# Patient Record
Sex: MALE | Race: AMERICAN INDIAN OR ALASKA NATIVE | NOT HISPANIC OR LATINO | Employment: UNEMPLOYED | URBAN - NONMETROPOLITAN AREA
[De-identification: names, ages, dates, MRNs, and addresses within clinical notes are randomized per-mention and may not be internally consistent; named-entity substitution may affect disease eponyms.]

---

## 2017-11-08 ENCOUNTER — NON-PROVIDER VISIT (OUTPATIENT)
Dept: URGENT CARE | Facility: PHYSICIAN GROUP | Age: 42
End: 2017-11-08

## 2017-11-08 DIAGNOSIS — Z02.1 PRE-EMPLOYMENT DRUG SCREENING: ICD-10-CM

## 2017-11-08 DIAGNOSIS — Z02.1 DRUG SCREENING, PRE-EMPLOYMENT: ICD-10-CM

## 2017-11-08 LAB
AMP AMPHETAMINE: NORMAL
BAR BARBITURATES: NORMAL
BZO BENZODIAZEPINES: NORMAL
COC COCAINE: NORMAL
INT CON NEG: NORMAL
INT CON POS: NORMAL
MDMA ECSTASY: NORMAL
MET METHAMPHETAMINES: NORMAL
MTD METHADONE: NORMAL
OPI OPIATES: NORMAL
OXY OXYCODONE: NORMAL
PCP PHENCYCLIDINE: NORMAL
POC URINE DRUG SCREEN OCDRS: NORMAL
THC: NORMAL

## 2017-11-08 PROCEDURE — 80305 DRUG TEST PRSMV DIR OPT OBS: CPT | Performed by: PHYSICIAN ASSISTANT

## 2017-12-21 ENCOUNTER — NON-PROVIDER VISIT (OUTPATIENT)
Dept: URGENT CARE | Facility: PHYSICIAN GROUP | Age: 42
End: 2017-12-21

## 2017-12-21 DIAGNOSIS — Z02.1 PRE-EMPLOYMENT DRUG SCREENING: ICD-10-CM

## 2017-12-21 LAB
AMP AMPHETAMINE: NORMAL
COC COCAINE: NORMAL
INT CON NEG: NORMAL
INT CON POS: NORMAL
MET METHAMPHETAMINES: NORMAL
OPI OPIATES: NORMAL
PCP PHENCYCLIDINE: NORMAL
POC DRUG COMMENT 753798-OCCUPATIONAL HEALTH: NORMAL
THC: NORMAL

## 2017-12-21 PROCEDURE — 80305 DRUG TEST PRSMV DIR OPT OBS: CPT | Performed by: PHYSICIAN ASSISTANT

## 2019-01-17 ENCOUNTER — NON-PROVIDER VISIT (OUTPATIENT)
Dept: URGENT CARE | Facility: PHYSICIAN GROUP | Age: 44
End: 2019-01-17

## 2019-01-17 DIAGNOSIS — Z02.1 PRE-EMPLOYMENT DRUG SCREENING: ICD-10-CM

## 2019-01-17 LAB
AMP AMPHETAMINE: NORMAL
COC COCAINE: NORMAL
INT CON NEG: NORMAL
INT CON POS: NORMAL
MET METHAMPHETAMINES: NORMAL
OPI OPIATES: NORMAL
PCP PHENCYCLIDINE: NORMAL
POC DRUG COMMENT 753798-OCCUPATIONAL HEALTH: NEGATIVE
THC: NORMAL

## 2019-01-17 PROCEDURE — 80305 DRUG TEST PRSMV DIR OPT OBS: CPT | Performed by: PHYSICIAN ASSISTANT

## 2019-04-11 ENCOUNTER — NON-PROVIDER VISIT (OUTPATIENT)
Dept: URGENT CARE | Facility: PHYSICIAN GROUP | Age: 44
End: 2019-04-11

## 2019-04-11 DIAGNOSIS — Z02.1 PRE-EMPLOYMENT DRUG SCREENING: ICD-10-CM

## 2019-04-11 PROCEDURE — 80305 DRUG TEST PRSMV DIR OPT OBS: CPT | Performed by: FAMILY MEDICINE

## 2021-08-22 ENCOUNTER — APPOINTMENT (OUTPATIENT)
Dept: RADIOLOGY | Facility: MEDICAL CENTER | Age: 46
End: 2021-08-22
Attending: EMERGENCY MEDICINE
Payer: MEDICAID

## 2021-08-22 ENCOUNTER — HOSPITAL ENCOUNTER (EMERGENCY)
Facility: MEDICAL CENTER | Age: 46
End: 2021-08-26
Attending: EMERGENCY MEDICINE
Payer: MEDICAID

## 2021-08-22 DIAGNOSIS — D50.0 BLOOD LOSS ANEMIA: ICD-10-CM

## 2021-08-22 DIAGNOSIS — F33.41 RECURRENT MAJOR DEPRESSIVE DISORDER, IN PARTIAL REMISSION (HCC): ICD-10-CM

## 2021-08-22 DIAGNOSIS — F10.929 ALCOHOLIC INTOXICATION WITH COMPLICATION (HCC): ICD-10-CM

## 2021-08-22 DIAGNOSIS — S41.112A ARM LACERATION, LEFT, INITIAL ENCOUNTER: ICD-10-CM

## 2021-08-22 DIAGNOSIS — R73.9 HYPERGLYCEMIA: ICD-10-CM

## 2021-08-22 DIAGNOSIS — U07.1 COVID-19: ICD-10-CM

## 2021-08-22 LAB
ABO + RH BLD: NORMAL
ABO GROUP BLD: NORMAL
ALBUMIN SERPL BCP-MCNC: 1.6 G/DL (ref 3.2–4.9)
ALBUMIN/GLOB SERPL: 0.9 G/DL
ALP SERPL-CCNC: 38 U/L (ref 30–99)
ALT SERPL-CCNC: 47 U/L (ref 2–50)
AMPHET UR QL SCN: POSITIVE
ANION GAP SERPL CALC-SCNC: 20 MMOL/L (ref 7–16)
APTT PPP: 41.8 SEC (ref 24.7–36)
AST SERPL-CCNC: 72 U/L (ref 12–45)
BARBITURATES UR QL SCN: NEGATIVE
BARCODED ABORH UBTYP: 5100
BARCODED PRD CODE UBPRD: NORMAL
BARCODED UNIT NUM UBUNT: NORMAL
BENZODIAZ UR QL SCN: POSITIVE
BILIRUB SERPL-MCNC: 0.7 MG/DL (ref 0.1–1.5)
BLD GP AB SCN SERPL QL: NORMAL
BUN SERPL-MCNC: 28 MG/DL (ref 8–22)
BZE UR QL SCN: NEGATIVE
CALCIUM SERPL-MCNC: 7 MG/DL (ref 8.5–10.5)
CANNABINOIDS UR QL SCN: POSITIVE
CHLORIDE SERPL-SCNC: 96 MMOL/L (ref 96–112)
CO2 SERPL-SCNC: 12 MMOL/L (ref 20–33)
COMPONENT R 8504R: NORMAL
CREAT SERPL-MCNC: 0.92 MG/DL (ref 0.5–1.4)
ERYTHROCYTE [DISTWIDTH] IN BLOOD BY AUTOMATED COUNT: 41.2 FL (ref 35.9–50)
ETHANOL BLD-MCNC: 33.2 MG/DL (ref 0–10)
FLUAV RNA SPEC QL NAA+PROBE: NEGATIVE
FLUBV RNA SPEC QL NAA+PROBE: NEGATIVE
GLOBULIN SER CALC-MCNC: 1.8 G/DL (ref 1.9–3.5)
GLUCOSE SERPL-MCNC: 234 MG/DL (ref 65–99)
HCT VFR BLD AUTO: 22.4 % (ref 42–52)
HGB BLD-MCNC: 7.2 G/DL (ref 14–18)
INR PPP: 1.85 (ref 0.87–1.13)
MCH RBC QN AUTO: 29.3 PG (ref 27–33)
MCHC RBC AUTO-ENTMCNC: 32.1 G/DL (ref 33.7–35.3)
MCV RBC AUTO: 91.1 FL (ref 81.4–97.8)
METHADONE UR QL SCN: NEGATIVE
OPIATES UR QL SCN: NEGATIVE
OXYCODONE UR QL SCN: NEGATIVE
PCP UR QL SCN: NEGATIVE
PLATELET # BLD AUTO: 124 K/UL (ref 164–446)
PMV BLD AUTO: 10.2 FL (ref 9–12.9)
POC BREATHALIZER: 0 PERCENT (ref 0–0.01)
POTASSIUM SERPL-SCNC: 4.3 MMOL/L (ref 3.6–5.5)
PRODUCT TYPE UPROD: NORMAL
PROPOXYPH UR QL SCN: NEGATIVE
PROT SERPL-MCNC: 3.4 G/DL (ref 6–8.2)
PROTHROMBIN TIME: 20.8 SEC (ref 12–14.6)
RBC # BLD AUTO: 2.46 M/UL (ref 4.7–6.1)
RH BLD: NORMAL
RSV RNA SPEC QL NAA+PROBE: NEGATIVE
SARS-COV-2 RNA RESP QL NAA+PROBE: DETECTED
SODIUM SERPL-SCNC: 128 MMOL/L (ref 135–145)
SPECIMEN SOURCE: ABNORMAL
UNIT STATUS USTAT: NORMAL
WBC # BLD AUTO: 5.6 K/UL (ref 4.8–10.8)

## 2021-08-22 PROCEDURE — 700102 HCHG RX REV CODE 250 W/ 637 OVERRIDE(OP): Performed by: EMERGENCY MEDICINE

## 2021-08-22 PROCEDURE — 85730 THROMBOPLASTIN TIME PARTIAL: CPT

## 2021-08-22 PROCEDURE — 700111 HCHG RX REV CODE 636 W/ 250 OVERRIDE (IP): Performed by: EMERGENCY MEDICINE

## 2021-08-22 PROCEDURE — 72128 CT CHEST SPINE W/O DYE: CPT

## 2021-08-22 PROCEDURE — 71260 CT THORAX DX C+: CPT

## 2021-08-22 PROCEDURE — 99285 EMERGENCY DEPT VISIT HI MDM: CPT

## 2021-08-22 PROCEDURE — 71045 X-RAY EXAM CHEST 1 VIEW: CPT

## 2021-08-22 PROCEDURE — 86923 COMPATIBILITY TEST ELECTRIC: CPT

## 2021-08-22 PROCEDURE — 96365 THER/PROPH/DIAG IV INF INIT: CPT | Mod: XU

## 2021-08-22 PROCEDURE — 0241U HCHG SARS-COV-2 COVID-19 NFCT DS RESP RNA 4 TRGT MIC: CPT

## 2021-08-22 PROCEDURE — 304999 HCHG REPAIR-SIMPLE/INTERMED LEVEL 1

## 2021-08-22 PROCEDURE — 700111 HCHG RX REV CODE 636 W/ 250 OVERRIDE (IP)

## 2021-08-22 PROCEDURE — 72125 CT NECK SPINE W/O DYE: CPT

## 2021-08-22 PROCEDURE — 85610 PROTHROMBIN TIME: CPT

## 2021-08-22 PROCEDURE — 82077 ASSAY SPEC XCP UR&BREATH IA: CPT

## 2021-08-22 PROCEDURE — 302970 POC BREATHALIZER: Performed by: EMERGENCY MEDICINE

## 2021-08-22 PROCEDURE — 36430 TRANSFUSION BLD/BLD COMPNT: CPT

## 2021-08-22 PROCEDURE — 86850 RBC ANTIBODY SCREEN: CPT

## 2021-08-22 PROCEDURE — 96375 TX/PRO/DX INJ NEW DRUG ADDON: CPT | Mod: XU

## 2021-08-22 PROCEDURE — C9803 HOPD COVID-19 SPEC COLLECT: HCPCS | Performed by: EMERGENCY MEDICINE

## 2021-08-22 PROCEDURE — 72131 CT LUMBAR SPINE W/O DYE: CPT

## 2021-08-22 PROCEDURE — 80053 COMPREHEN METABOLIC PANEL: CPT

## 2021-08-22 PROCEDURE — A9270 NON-COVERED ITEM OR SERVICE: HCPCS | Performed by: EMERGENCY MEDICINE

## 2021-08-22 PROCEDURE — 303747 HCHG EXTRA SUTURE

## 2021-08-22 PROCEDURE — 70450 CT HEAD/BRAIN W/O DYE: CPT

## 2021-08-22 PROCEDURE — 85027 COMPLETE CBC AUTOMATED: CPT

## 2021-08-22 PROCEDURE — 72170 X-RAY EXAM OF PELVIS: CPT

## 2021-08-22 PROCEDURE — 90715 TDAP VACCINE 7 YRS/> IM: CPT | Performed by: EMERGENCY MEDICINE

## 2021-08-22 PROCEDURE — 700101 HCHG RX REV CODE 250: Performed by: EMERGENCY MEDICINE

## 2021-08-22 PROCEDURE — 86901 BLOOD TYPING SEROLOGIC RH(D): CPT

## 2021-08-22 PROCEDURE — 90791 PSYCH DIAGNOSTIC EVALUATION: CPT

## 2021-08-22 PROCEDURE — 90471 IMMUNIZATION ADMIN: CPT

## 2021-08-22 PROCEDURE — 700117 HCHG RX CONTRAST REV CODE 255: Performed by: EMERGENCY MEDICINE

## 2021-08-22 PROCEDURE — P9016 RBC LEUKOCYTES REDUCED: HCPCS

## 2021-08-22 PROCEDURE — 305949 HCHG RED TRAUMA ACT PRE-NOTIFY NO CC

## 2021-08-22 PROCEDURE — 86900 BLOOD TYPING SEROLOGIC ABO: CPT

## 2021-08-22 PROCEDURE — 96376 TX/PRO/DX INJ SAME DRUG ADON: CPT | Mod: XU

## 2021-08-22 PROCEDURE — 80307 DRUG TEST PRSMV CHEM ANLYZR: CPT

## 2021-08-22 PROCEDURE — 700105 HCHG RX REV CODE 258: Performed by: EMERGENCY MEDICINE

## 2021-08-22 RX ORDER — HALOPERIDOL 5 MG/1
5 TABLET ORAL ONCE
Status: COMPLETED | OUTPATIENT
Start: 2021-08-22 | End: 2021-08-22

## 2021-08-22 RX ORDER — MIDAZOLAM HYDROCHLORIDE 1 MG/ML
INJECTION INTRAMUSCULAR; INTRAVENOUS
Status: COMPLETED
Start: 2021-08-22 | End: 2021-08-22

## 2021-08-22 RX ORDER — KETAMINE HYDROCHLORIDE 50 MG/ML
INJECTION, SOLUTION INTRAMUSCULAR; INTRAVENOUS
Status: COMPLETED | OUTPATIENT
Start: 2021-08-22 | End: 2021-08-22

## 2021-08-22 RX ORDER — MIDAZOLAM HYDROCHLORIDE 1 MG/ML
2 INJECTION INTRAMUSCULAR; INTRAVENOUS ONCE
Status: COMPLETED | OUTPATIENT
Start: 2021-08-22 | End: 2021-08-22

## 2021-08-22 RX ORDER — CEFAZOLIN SODIUM 2 G/100ML
INJECTION, SOLUTION INTRAVENOUS
Status: COMPLETED | OUTPATIENT
Start: 2021-08-22 | End: 2021-08-22

## 2021-08-22 RX ORDER — SODIUM CHLORIDE, SODIUM LACTATE, POTASSIUM CHLORIDE, AND CALCIUM CHLORIDE .6; .31; .03; .02 G/100ML; G/100ML; G/100ML; G/100ML
INJECTION, SOLUTION INTRAVENOUS
Status: COMPLETED | OUTPATIENT
Start: 2021-08-22 | End: 2021-08-22

## 2021-08-22 RX ORDER — DIPHENOXYLATE HYDROCHLORIDE AND ATROPINE SULFATE 2.5; .025 MG/1; MG/1
1 TABLET ORAL ONCE
Status: COMPLETED | OUTPATIENT
Start: 2021-08-22 | End: 2021-08-22

## 2021-08-22 RX ORDER — MIDAZOLAM HYDROCHLORIDE 1 MG/ML
INJECTION INTRAMUSCULAR; INTRAVENOUS
Status: COMPLETED | OUTPATIENT
Start: 2021-08-22 | End: 2021-08-22

## 2021-08-22 RX ORDER — ONDANSETRON 2 MG/ML
4 INJECTION INTRAMUSCULAR; INTRAVENOUS ONCE
Status: COMPLETED | OUTPATIENT
Start: 2021-08-22 | End: 2021-08-22

## 2021-08-22 RX ORDER — LIDOCAINE HYDROCHLORIDE AND EPINEPHRINE 10; 10 MG/ML; UG/ML
20 INJECTION, SOLUTION INFILTRATION; PERINEURAL ONCE
Status: COMPLETED | OUTPATIENT
Start: 2021-08-22 | End: 2021-08-22

## 2021-08-22 RX ORDER — LORAZEPAM 2 MG/1
2 TABLET ORAL ONCE
Status: COMPLETED | OUTPATIENT
Start: 2021-08-22 | End: 2021-08-22

## 2021-08-22 RX ADMIN — LORAZEPAM 2 MG: 2 TABLET ORAL at 21:30

## 2021-08-22 RX ADMIN — SODIUM CHLORIDE, POTASSIUM CHLORIDE, SODIUM LACTATE AND CALCIUM CHLORIDE 1 L: 600; 310; 30; 20 INJECTION, SOLUTION INTRAVENOUS at 12:00

## 2021-08-22 RX ADMIN — FENTANYL CITRATE 100 MCG: 50 INJECTION, SOLUTION INTRAMUSCULAR; INTRAVENOUS at 12:02

## 2021-08-22 RX ADMIN — CEFAZOLIN SODIUM 2 G: 2 INJECTION, SOLUTION INTRAVENOUS at 12:15

## 2021-08-22 RX ADMIN — ONDANSETRON 4 MG: 2 INJECTION INTRAMUSCULAR; INTRAVENOUS at 17:55

## 2021-08-22 RX ADMIN — MIDAZOLAM HYDROCHLORIDE 2 MG: 1 INJECTION INTRAMUSCULAR; INTRAVENOUS at 12:25

## 2021-08-22 RX ADMIN — ONDANSETRON 4 MG: 2 INJECTION INTRAMUSCULAR; INTRAVENOUS at 18:37

## 2021-08-22 RX ADMIN — MIDAZOLAM HYDROCHLORIDE 2 MG: 1 INJECTION, SOLUTION INTRAMUSCULAR; INTRAVENOUS at 12:25

## 2021-08-22 RX ADMIN — IOHEXOL 100 ML: 350 INJECTION, SOLUTION INTRAVENOUS at 12:38

## 2021-08-22 RX ADMIN — HALOPERIDOL 5 MG: 5 TABLET ORAL at 21:30

## 2021-08-22 RX ADMIN — CLOSTRIDIUM TETANI TOXOID ANTIGEN (FORMALDEHYDE INACTIVATED), CORYNEBACTERIUM DIPHTHERIAE TOXOID ANTIGEN (FORMALDEHYDE INACTIVATED), BORDETELLA PERTUSSIS TOXOID ANTIGEN (GLUTARALDEHYDE INACTIVATED), BORDETELLA PERTUSSIS FILAMENTOUS HEMAGGLUTININ ANTIGEN (FORMALDEHYDE INACTIVATED), BORDETELLA PERTUSSIS PERTACTIN ANTIGEN, AND BORDETELLA PERTUSSIS FIMBRIAE 2/3 ANTIGEN 0.5 ML: 5; 2; 2.5; 5; 3; 5 INJECTION, SUSPENSION INTRAMUSCULAR at 12:08

## 2021-08-22 RX ADMIN — KETAMINE HYDROCHLORIDE 50 MG: 50 INJECTION INTRAMUSCULAR; INTRAVENOUS at 12:02

## 2021-08-22 RX ADMIN — MIDAZOLAM HYDROCHLORIDE 3 MG: 1 INJECTION, SOLUTION INTRAMUSCULAR; INTRAVENOUS at 12:00

## 2021-08-22 RX ADMIN — LIDOCAINE HYDROCHLORIDE,EPINEPHRINE BITARTRATE 20 ML: 10; .01 INJECTION, SOLUTION INFILTRATION; PERINEURAL at 13:20

## 2021-08-22 RX ADMIN — DIPHENOXYLATE HYDROCHLORIDE AND ATROPINE SULFATE 1 TABLET: 2.5; .025 TABLET ORAL at 19:15

## 2021-08-22 NOTE — ED NOTES
Unable to obtain another IV access point.    MD Carballo to order Right groin art stick for labs.

## 2021-08-22 NOTE — ED NOTES
Pt awoken, pt reoriented to situation. Pt states that he cut his own arm in attempt to hurt himself. When asked if pt still wants to hurt himself, pt denies. Pt moaning but unable to express what is hurting. Pt falls asleep quickly. Attempted to obtain urine via urinal, no success. VSS on RA
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
## 2021-08-22 NOTE — ED NOTES
Pt incontinent of large amount of stool. Pericare performed, linens changed. Pt responds to pain and movement, occasionally yells out. Snoring, nasal trumpet in place

## 2021-08-23 PROCEDURE — A9270 NON-COVERED ITEM OR SERVICE: HCPCS | Performed by: EMERGENCY MEDICINE

## 2021-08-23 PROCEDURE — 90791 PSYCH DIAGNOSTIC EVALUATION: CPT | Performed by: SOCIAL WORKER

## 2021-08-23 PROCEDURE — 700102 HCHG RX REV CODE 250 W/ 637 OVERRIDE(OP): Performed by: EMERGENCY MEDICINE

## 2021-08-23 RX ORDER — LOPERAMIDE HYDROCHLORIDE 2 MG/1
2 CAPSULE ORAL 4 TIMES DAILY PRN
Status: DISCONTINUED | OUTPATIENT
Start: 2021-08-23 | End: 2021-08-26 | Stop reason: HOSPADM

## 2021-08-23 RX ORDER — ACETAMINOPHEN 325 MG/1
650 TABLET ORAL ONCE
Status: COMPLETED | OUTPATIENT
Start: 2021-08-23 | End: 2021-08-23

## 2021-08-23 RX ADMIN — LOPERAMIDE HYDROCHLORIDE 2 MG: 2 CAPSULE ORAL at 15:17

## 2021-08-23 RX ADMIN — ACETAMINOPHEN 650 MG: 325 TABLET, FILM COATED ORAL at 15:30

## 2021-08-23 ASSESSMENT — PAIN DESCRIPTION - PAIN TYPE: TYPE: ACUTE PAIN

## 2021-08-23 NOTE — ED NOTES
Pt asleep on gurney; respirations equal bilateral and not labored. Pt has no needs at this time. Sitter in place for pt safety. RN will continue to monitor.

## 2021-08-23 NOTE — ED NOTES
Patient's home medications have been reviewed by the pharmacy team.     No past medical history on file.    Patient's Medications    No medications on file          A:  Medications do not appear to be contributing to current complaints.       P:    No recommendations at this time.       Cristobal Laughlin, PharmD

## 2021-08-23 NOTE — ED NOTES
Legal 2000 hold. Pt resting in gurney, no acute distress noted. In full view of sitter at all times.
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
## 2021-08-23 NOTE — ED NOTES
Pt is not currently taking medications. Pt has not taking prescription medications  In > 5 years.      Med rec updated and complete.    No preferred pharmacy
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
## 2021-08-23 NOTE — DISCHARGE PLANNING
Medical Social Work    Referral: Legal Hold    Intervention: Legal Hold Paperwork given to SW by [employer] RN Jocy Watts    Legal Hold Initiated: Date: 08- Time: 2115    Patient’s Insurance Listed on Face Sheet: Medicaid FFS    Referrals sent to: [facility], [facility], [facility]    This referral contains the following information:  1) Face sheet _x___  2) Page 1 and Page 2 of Legal Hold ___x_  3) Alert Team Assessment/Psych Assessment __x__  4) Head to toe physical exam _x___  5) Urine Drug Screen __x__  6) Blood Alcohol __x__  7) Vital signs __x__  8) Pregnancy test when applicable _NA__  9) Medications list __x__    Plan: Patient will transfer to mental health facility once acceptance is obtained

## 2021-08-23 NOTE — ED NOTES
Legal 2000 hold. Pt given orange juice, able to void in bedside commode, no acute distress noted. In full view of sitter at all times.
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
## 2021-08-24 ASSESSMENT — ENCOUNTER SYMPTOMS
INSOMNIA: 0
DEPRESSION: 1
DIZZINESS: 0
SHORTNESS OF BREATH: 0
HEADACHES: 0
ORTHOPNEA: 0
BLURRED VISION: 0
PALPITATIONS: 0
CHILLS: 0
COUGH: 0
MYALGIAS: 0
NAUSEA: 0
NERVOUS/ANXIOUS: 1
FEVER: 0
VOMITING: 0

## 2021-08-24 NOTE — DISCHARGE PLANNING
Legal Hold     Referral: Legal Hold Court     Intervention: Pt presented for legal hold meeting with  via phone call to discuss legal options of contesting hold and meeting with the court doctors tomorrow afternoon, or not contesting legal hold and continuing the hold for one week.  advised that pt's legal hold will be be continued for one week (9/2).       Plan: Pt's legal hold has been continued for one week. Pt awaiting transfer to an in patient psych facility.

## 2021-08-24 NOTE — ED NOTES
Pt resting comfortably.  Bed repositioned for comfort and lights dimmed.  Sitter remains in place for continuous 1:1 observation.

## 2021-08-24 NOTE — ED PROVIDER NOTES
ED Provider Note    ED Provider Note Addendum     This is an addendum to the note on Manny Wilson.  For further details and full chart information, see patient's initial note.          2:15 PM   - Patient evaluated by myself.  Patient is resting comfortably at this time with no complaints. Patient is awaiting transfer to psychiatric facility for further psych evaluation.  Psychiatry is recommended to continue his hold    Disposition:  Patient will be transferred to an appropriate psychiatric facility in stable condition for further psychiatric care and evaluation.  Patient will be placed under the care of my partner awaiting transfer.    FINAL IMPRESSION  1. Self-inflicted injury    2. Arm laceration, left, initial encounter    3. Blood loss anemia    4. Alcoholic intoxication with complication (HCC)    5. Suicidal ideation            The note accurately reflects work and decisions made by me.  Yfn Soria M.D.  8/24/2021  2:15 PM

## 2021-08-24 NOTE — CONSULTS
"                  [facility] INITIAL PSYCHIATRIC EVALUATION    This provider informed the patient their medical records are totally confidential except for the use by other providers involved in their care, or if the patient signs a release, or to report instances of child or elder abuse, or if it is determined they are an immediate risk to harm themselves or others.      CONSULTED BY:   Yfn Soria M.d.     REASON FOR CONSULT:   Psychiatric evaluation     Chief Complaint   Patient presents with   • Trauma Red     This evaluation was conducted via Zoom using secure and encrypted videoconferencing technology. The patient was physically located at [facility] in [place], [place]. The patient was presented by a medical professional at the originating site.  The patient's identity was confirmed and verbal consent was obtained for this telemedicine encounter.    HISTORY OF PRESENT ILLNESS  Chart reviewed. Patient consents to telepsychiatry. 46M presented to the ED on 8/22/21, BIBEMS after being found \"face down in a field\" with an apparent self inflicted laceration to the left forearm.   Patient seen at bedside. He says \"I tried to kill myself.\" He says that he had not planned to kill himself but that he was being pursued and that the people that wanted to kill him, wanted it to look like suicide. Patient believes they \"are playing mind games\". He says that he was made to \"walk in the freezing dessert\". He says that its members \"of the [place] who want me dead\"   Patient reports long hx of SI but had been putting it \"off for years\" because I didn't want my mom to deal with the effects of my suicide. He does not want to discuss to much with this writer but admits that he would rather \"die than let them get me\". Despite that he reports being \"safe\" in the hospital and will not attempt to hurt himself or others.       MMSE  Cannot assess at this time       MEDICAL REVIEW OF SYSTEMS:   Review of Systems "   Constitutional: Negative for chills and fever.   HENT: Negative for hearing loss.    Eyes: Negative for blurred vision.   Respiratory: Negative for cough and shortness of breath.    Cardiovascular: Negative for palpitations and orthopnea.   Gastrointestinal: Negative for nausea and vomiting.   Genitourinary: Negative for dysuria.   Musculoskeletal: Negative for myalgias.   Neurological: Negative for dizziness and headaches.   Psychiatric/Behavioral: Positive for depression. Negative for suicidal ideas. The patient is nervous/anxious. The patient does not have insomnia.          PAST PSYCHIATRIC HISTORY  Denied     FAMILY HISTORY  Denied       SOCIAL HISTORY  Released from [facility] 10 days ago, denies having friends    DRUGS: denied     ALCOHOL: occasional     TOBACCO: denied     MEDICAL HISTORY       No past medical history on file.  No results found for this or any previous visit.    Lab Results   Component Value Date/Time    WBC 5.6 08/22/2021 11:59 AM    RBC 2.46 (L) 08/22/2021 11:59 AM    HEMOGLOBIN 7.2 (L) 08/22/2021 11:59 AM    HEMATOCRIT 22.4 (L) 08/22/2021 11:59 AM    MCV 91.1 08/22/2021 11:59 AM    MCH 29.3 08/22/2021 11:59 AM    MCHC 32.1 (L) 08/22/2021 11:59 AM    MPV 10.2 08/22/2021 11:59 AM      Lab Results   Component Value Date/Time    SODIUM 128 (L) 08/22/2021 11:59 AM    POTASSIUM 4.3 08/22/2021 11:59 AM    CHLORIDE 96 08/22/2021 11:59 AM    CO2 12 (L) 08/22/2021 11:59 AM    GLUCOSE 234 (H) 08/22/2021 11:59 AM    BUN 28 (H) 08/22/2021 11:59 AM    CREATININE 0.92 08/22/2021 11:59 AM      No results found for this or any previous visit.    CT-HEAD W/O 08/22/2021    Narrative  8/22/2021 12:07 PM    HISTORY/REASON FOR EXAM:  Loss of consciousness found down head laceration.      TECHNIQUE/EXAM DESCRIPTION AND NUMBER OF VIEWS:  CT of the head without contrast.    Contiguous 5 mm axial sections were obtained from the skull base through the vertex.    Up to date radiation dose reduction adjustments have been  "utilized to meet ALARA standards for radiation dose reduction.    COMPARISON:  None available    FINDINGS:    There is no evidence of extra-axial hemorrhage. No intra-axial hemorrhage is noted. There is no brain swelling or edema. No mass effect or midline shift is noted.  Punctate opaque foreign bodies are noted at or near the skin surface in the frontal area.    Impression  NO ACUTE ABNORMALITIES ARE NOTED ON CT SCAN OF THE HEAD.      CURRENT MEDICATIONS         Current Facility-Administered Medications:   •  loperamide  No current outpatient medications on file.     ALLERGIES       Not on File    MENTAL STATUS EXAMINATION    /65   Pulse 81   Temp 37.2 °C (98.9 °F) (Temporal)   Resp 17   Ht 1.778 m (5' 10\")   Wt 113 kg (250 lb)   SpO2 94%   BMI 35.87 kg/m²   Participation: Active verbal participation  Grooming:Casual  Orientation: Fully Oriented  Eye contact: Limited  Behavior:Calm   Mood: Euthymic  Affect: Constricted  Thought process: Circumstantial  Thought content:  Evidence of delusion  Speech: Rate within normal limits  Perception:  Illusions and Depersonalization  Memory:  Poor memory for chronology of events  Insight: Poor  Judgment: Poor  Family/couple interaction observations:   Other:    CURRENT RISK       Suicidal:Moderate       Homicidal:Low       Self-Harm:Moderate       Relapse:High       Crisis Safety Plan Reviewed No    ASSESSMENT       On exam, patient is guarded, illogical, paranoid, however, denies SI/HI at this time. Legal hold extended, patient is for inpatient psychiatry once medically cleared.       DSM-5 Dx  Psychosis nos   Rule out schizophrenia   Rule out substance induced psychosis     PLAN  - Medications   Haldol 5mg PO/IM q6h prn agitation   Ativan 2mg PO/IM q6h prn anxiety   - Labs reviewed, Etoh 33.2 on admission, UDS + amphetamines, benzos, cannabis    - Imaging reviewed, CT head 8/22/21  - EKG reviewed, Qtc none available     -please monitor Qtc with neuroleptic " use    Legal Hold: extended  Sitter: 1:1  Visitors: restricted   Personal Belongings: restricted   Phone: hospital only, immediate family, supervised.        Discussed findings, diagnosis and recommendations with Dr. Soria.

## 2021-08-24 NOTE — DISCHARGE PLANNING
Received Verified Involuntary Court Ordered Petition from the court. Scanned copy of Petition into pt's chart.

## 2021-08-24 NOTE — ED NOTES
Per PSA, pt was sleeping and rolled over off the edge of the gurney onto the floor on L side of body/arm/torso.  Pt did not hit his head.  Pt got up independently and got back into bed.  Pt assessed, dressing in place, CDI, no signs of injury or trauma.  Pt denies hitting his head.  Pt denies injury.  ERP aware.

## 2021-08-24 NOTE — ED NOTES
Patient resting comfortably. Chest rise and fall noted. Patient in direct observation of sitter. Will Continue to monitor.      I will SWITCH the dose or number of times a day I take the medications listed below when I get home from the hospital:  None

## 2021-08-24 NOTE — DISCHARGE PLANNING
Alert Team:    Patient rested throughout the night without incident with 1:1 sitter observing patient safety outside of room. IP psychiatry consult completed 8/23/2021; legal hold extended; continue 1:1 sitter.  Spoke with Na at [facility] and informed that they are unable to accept Covid positive patient on their behavioral health unit at this time. Alert Team will continue to monitor.

## 2021-08-24 NOTE — ED NOTES
Report from Parker BOYKIN.  Patient care assumed at this time.  Sitter in place for continuous 1:1 observation.

## 2021-08-25 LAB
ALBUMIN SERPL BCP-MCNC: 3.4 G/DL (ref 3.2–4.9)
ALBUMIN/GLOB SERPL: 1.1 G/DL
ALP SERPL-CCNC: 68 U/L (ref 30–99)
ALT SERPL-CCNC: 50 U/L (ref 2–50)
ANION GAP SERPL CALC-SCNC: 10 MMOL/L (ref 7–16)
AST SERPL-CCNC: 48 U/L (ref 12–45)
BILIRUB SERPL-MCNC: 0.5 MG/DL (ref 0.1–1.5)
BUN SERPL-MCNC: 18 MG/DL (ref 8–22)
CALCIUM SERPL-MCNC: 8.1 MG/DL (ref 8.5–10.5)
CHLORIDE SERPL-SCNC: 91 MMOL/L (ref 96–112)
CO2 SERPL-SCNC: 23 MMOL/L (ref 20–33)
CREAT SERPL-MCNC: 0.96 MG/DL (ref 0.5–1.4)
GLOBULIN SER CALC-MCNC: 3.1 G/DL (ref 1.9–3.5)
GLUCOSE BLD-MCNC: 280 MG/DL (ref 65–99)
GLUCOSE BLD-MCNC: 334 MG/DL (ref 65–99)
GLUCOSE BLD-MCNC: 378 MG/DL (ref 65–99)
GLUCOSE BLD-MCNC: 395 MG/DL (ref 65–99)
GLUCOSE BLD-MCNC: 404 MG/DL (ref 65–99)
GLUCOSE SERPL-MCNC: 413 MG/DL (ref 65–99)
POTASSIUM SERPL-SCNC: 4.7 MMOL/L (ref 3.6–5.5)
PROT SERPL-MCNC: 6.5 G/DL (ref 6–8.2)
SODIUM SERPL-SCNC: 124 MMOL/L (ref 135–145)

## 2021-08-25 PROCEDURE — A9270 NON-COVERED ITEM OR SERVICE: HCPCS | Performed by: EMERGENCY MEDICINE

## 2021-08-25 PROCEDURE — 700105 HCHG RX REV CODE 258: Performed by: EMERGENCY MEDICINE

## 2021-08-25 PROCEDURE — 96375 TX/PRO/DX INJ NEW DRUG ADDON: CPT | Mod: XU

## 2021-08-25 PROCEDURE — 80053 COMPREHEN METABOLIC PANEL: CPT

## 2021-08-25 PROCEDURE — 700102 HCHG RX REV CODE 250 W/ 637 OVERRIDE(OP): Performed by: EMERGENCY MEDICINE

## 2021-08-25 PROCEDURE — 93005 ELECTROCARDIOGRAM TRACING: CPT | Performed by: STUDENT IN AN ORGANIZED HEALTH CARE EDUCATION/TRAINING PROGRAM

## 2021-08-25 PROCEDURE — 82962 GLUCOSE BLOOD TEST: CPT

## 2021-08-25 PROCEDURE — A9270 NON-COVERED ITEM OR SERVICE: HCPCS | Performed by: STUDENT IN AN ORGANIZED HEALTH CARE EDUCATION/TRAINING PROGRAM

## 2021-08-25 PROCEDURE — 700102 HCHG RX REV CODE 250 W/ 637 OVERRIDE(OP): Performed by: STUDENT IN AN ORGANIZED HEALTH CARE EDUCATION/TRAINING PROGRAM

## 2021-08-25 PROCEDURE — 96372 THER/PROPH/DIAG INJ SC/IM: CPT

## 2021-08-25 RX ORDER — FLUTICASONE PROPIONATE 50 MCG
2 SPRAY, SUSPENSION (ML) NASAL DAILY
Status: SHIPPED | COMMUNITY
End: 2021-08-26 | Stop reason: SDUPTHER

## 2021-08-25 RX ORDER — DEXTROSE MONOHYDRATE 25 G/50ML
50 INJECTION, SOLUTION INTRAVENOUS
Status: DISCONTINUED | OUTPATIENT
Start: 2021-08-25 | End: 2021-08-26 | Stop reason: HOSPADM

## 2021-08-25 RX ORDER — OLANZAPINE 5 MG/1
10 TABLET ORAL EVERY EVENING
Status: DISCONTINUED | OUTPATIENT
Start: 2021-08-25 | End: 2021-08-26 | Stop reason: HOSPADM

## 2021-08-25 RX ORDER — INSULIN GLARGINE 100 [IU]/ML
10 INJECTION, SOLUTION SUBCUTANEOUS 2 TIMES DAILY
Status: SHIPPED | COMMUNITY
End: 2021-08-26 | Stop reason: SDUPTHER

## 2021-08-25 RX ORDER — DEXTROSE MONOHYDRATE 25 G/50ML
50 INJECTION, SOLUTION INTRAVENOUS
Status: DISCONTINUED | OUTPATIENT
Start: 2021-08-25 | End: 2021-08-25

## 2021-08-25 RX ORDER — SODIUM CHLORIDE 9 MG/ML
1000 INJECTION, SOLUTION INTRAVENOUS ONCE
Status: COMPLETED | OUTPATIENT
Start: 2021-08-25 | End: 2021-08-25

## 2021-08-25 RX ORDER — OMEPRAZOLE 20 MG/1
20 CAPSULE, DELAYED RELEASE ORAL DAILY
Status: SHIPPED | COMMUNITY
End: 2021-08-26 | Stop reason: SDUPTHER

## 2021-08-25 RX ORDER — DIPHENHYDRAMINE HCL 25 MG
25 TABLET ORAL ONCE
Status: COMPLETED | OUTPATIENT
Start: 2021-08-25 | End: 2021-08-25

## 2021-08-25 RX ORDER — ZOLPIDEM TARTRATE 5 MG/1
5 TABLET ORAL ONCE
Status: COMPLETED | OUTPATIENT
Start: 2021-08-25 | End: 2021-08-25

## 2021-08-25 RX ORDER — GABAPENTIN 300 MG/1
600 CAPSULE ORAL 3 TIMES DAILY
Status: SHIPPED | COMMUNITY
End: 2021-08-26 | Stop reason: SDUPTHER

## 2021-08-25 RX ADMIN — DIPHENHYDRAMINE HYDROCHLORIDE 25 MG: 25 TABLET ORAL at 03:24

## 2021-08-25 RX ADMIN — INSULIN GLARGINE 10 UNITS: 100 INJECTION, SOLUTION SUBCUTANEOUS at 20:25

## 2021-08-25 RX ADMIN — OLANZAPINE 10 MG: 5 TABLET, FILM COATED ORAL at 20:27

## 2021-08-25 RX ADMIN — SODIUM CHLORIDE 1000 ML: 9 INJECTION, SOLUTION INTRAVENOUS at 15:59

## 2021-08-25 RX ADMIN — ZOLPIDEM TARTRATE 5 MG: 5 TABLET ORAL at 21:08

## 2021-08-25 RX ADMIN — LOPERAMIDE HYDROCHLORIDE 2 MG: 2 CAPSULE ORAL at 01:47

## 2021-08-25 ASSESSMENT — ENCOUNTER SYMPTOMS
HEADACHES: 0
COUGH: 1
VOMITING: 0
CHILLS: 0
DIZZINESS: 0
SHORTNESS OF BREATH: 0
MYALGIAS: 0
NAUSEA: 0
PALPITATIONS: 0
BLURRED VISION: 0
FEVER: 0
DEPRESSION: 1
DOUBLE VISION: 0

## 2021-08-25 NOTE — ED NOTES
Pt sitting up on side of bed in no apparent distress. Pt's breaths are even and unlabored at this time. Sitter still in view of pt. Will continue to monitor.

## 2021-08-25 NOTE — ED NOTES
Pt resting in bed with even and unlabored breaths at this time. No acute distress noted and sitter is still in view of pt. Will continue to monitor.

## 2021-08-25 NOTE — ED PROVIDER NOTES
ED PROVIDER NOTE    Scribed for Lisa Mcghee M.D. by Harika Durán. 8/25/2021, 6:59 AM.    This is an addendum to the note on Manny Wilson. For further details and full chart entry, see the previously signed ED Provider Note written by Dr. Tao (ERP).      6:59 AM - I discussed the patient's case with Dr. Tao (ERP) who will transfer care of the patient to me at this time.        11:17 AM - Manny Wilson was just evaluated by Dr. Rodriguez by Tele psych. He state that he has a hard time placing him due to is COVID diagnosis. He will start Manny on Zyprexa to see if he can be stabilized on that.    11:32 AM  - Patient was reevaluated at bedside. Manny states that he is feeling well and has no complaints at this time. He notes that he has a history of diabetes and takes Humalog. The patient's continuing management included monitoring the patient and ordering more labs.    11:34 Patient treated with Zyprexa 10 mg tablet.    11:41 AM I ordered CMP because patient had elevated blood sugar with an anion gap acidosis with a arrived here in the ER several days ago.  The gets reasonable to recheck his blood sugar and his labs to be sure that things have corrected over time.    Patient's care was transferred to me at change of shift.  I reviewed the labs.  I spoken with the patient.  He says he is a diabetic and typically takes Humalog.  Review of the labs from several days ago when the patient was admitted reveal that he did have elevated blood sugar with what looks like an anion gap acidosis.  Labs had not been rechecked.  I thought it would be prudent to go ahead and recheck labs.  Repeat labs reveal blood sugar of 413.  Sodium is little low at 124.  This could be pseudohyponatremia.  His anion gap acidosis has corrected.  His medication reconciliation list had not been entered in the computer so this was reviewed and entered.  Patient was evaluated by telepsychiatry and Dr. Rodriguez plans on placing patient  on Zyprexa to see if that will stabilize him a bit.  At this time patient is unable to be placed in any psychiatric facility due to his COVID-19 positivity.  At this time he does not have any trouble breathing.  He is not in any respiratory distress.  He has no pulmonary complaints and his O2 sats are 97% on room air.  He appears to be holding his own from a Covid standpoint.  I wrote for a liter of fluid and wrote for some IV insulin once his labs came back revealing the sodium of 124 and the sugar of 413.     LABS  Results for VIDYA DAIGLE (MRN [id number]) as of 8/25/2021 14:00   Ref. Range 8/25/2021 12:40   Sodium Latest Ref Range: 135 - 145 mmol/L 124 (L)   Potassium Latest Ref Range: 3.6 - 5.5 mmol/L 4.7   Chloride Latest Ref Range: 96 - 112 mmol/L 91 (L)   Co2 Latest Ref Range: 20 - 33 mmol/L 23   Anion Gap Latest Ref Range: 7.0 - 16.0  10.0   Glucose Latest Ref Range: 65 - 99 mg/dL 413 (H)   Bun Latest Ref Range: 8 - 22 mg/dL 18   Creatinine Latest Ref Range: 0.50 - 1.40 mg/dL 0.96   GFR If  Latest Ref Range: >60 mL/min/1.73 m 2 >60   GFR If Non  Latest Ref Range: >60 mL/min/1.73 m 2 >60   Calcium Latest Ref Range: 8.5 - 10.5 mg/dL 8.1 (L)   AST(SGOT) Latest Ref Range: 12 - 45 U/L 48 (H)   ALT(SGPT) Latest Ref Range: 2 - 50 U/L 50   Alkaline Phosphatase Latest Ref Range: 30 - 99 U/L 68   Total Bilirubin Latest Ref Range: 0.1 - 1.5 mg/dL 0.5   Albumin Latest Ref Range: 3.2 - 4.9 g/dL 3.4   Total Protein Latest Ref Range: 6.0 - 8.2 g/dL 6.5   Globulin Latest Ref Range: 1.9 - 3.5 g/dL 3.1   A-G Ratio Latest Units: g/dL 1.1         FINAL IMPRESSION   1. Self-inflicted injury    2. Arm laceration, left, initial encounter    3. Blood loss anemia    4. Alcoholic intoxication with complication (HCC)    5. Suicidal ideation    6. Hyperglycemia    7. COVID-19      This dictation has been created using voice recognition software. The accuracy of the dictation is limited by the abilities  of the software. I expect there may be some errors of grammar and possibly content. I made every attempt to manually correct the errors within my dictation. However, errors related to voice recognition software may still exist and should be interpreted within the appropriate context.     I, Harika Durán (Edward), am scribing for, and in the presence of, Lisa Mcghee M.D..    Electronically signed by: Harika Durán (Edward), 8/25/2021    ILisa M.D. personally performed the services described in this documentation, as scribed by Harika Durán in my presence, and it is both accurate and complete.    The note accurately reflects work and decisions made by me.  Lisa Mcghee M.D.  8/25/2021  2:00 PM

## 2021-08-25 NOTE — ED NOTES
~Med rec updated and complete per pt at bedside.     ~Pt unable to verify Wellbutrin dose. Pt reports he receives he medications from [facility] but asked this med rec NOT to call and verify any of his medications.

## 2021-08-25 NOTE — ED NOTES
Report given to Ella FELICIANO RN. At this time pt is resting in bed with even and unlabored breaths, in no apparent distress. Sitter still in view of pt.

## 2021-08-25 NOTE — CONSULTS
"                  [facility] INITIAL PSYCHIATRIC EVALUATION    This provider informed the patient their medical records are totally confidential except for the use by other providers involved in their care, or if the patient signs a release, or to report instances of child or elder abuse, or if it is determined they are an immediate risk to harm themselves or others.    This evaluation was conducted via Zoom using secure and encrypted videoconferencing technology. The patient was physically located at [facility] in [place], [place]. The patient was presented by a medical professional at the originating site.  The patient's identity was confirmed and verbal consent was obtained for this telemedicine encounter.    CONSULTED BY:   Lisa Mcghee M.d.     REASON FOR CONSULT:   Psychiatric evaluation    Chief Complaint   Patient presents with   • Trauma Red         HISTORY OF PRESENT ILLNESS  Chart reviewed. Patient consents to telepsychiatry.   46M presented to the ED on 8/22/21, BIBEMS after being found \"face down in a field\" with an apparent self inflicted laceration to the left forearm.   Patient is seen at bedside. He is in isolation due to covid + status. He says that he is hanging in there. He says that he is \"safe\" here, meaning in the hospital. He reports feeling safe when he was in group home. He endorses feeling restless and anxious, he reports worry about having a cell phone and being tracked. Sleep is poor, only sleeping a few minutes at a time. He endorses fatigue. He denies ideas of reference or influence. He denies any thought intrusion or broadcasting.   He endorses daily sad mood, feelings of emptiness, hopeless everyday, worthless, he had daily thoughts of death and anhedonia, admits that these feelings started 2/2 not being able to \"get away from these people, they're controlling my life\". Denies any SI at this time. Denies HI/AHVH.       MEDICAL REVIEW OF SYSTEMS:   Review of Systems " "  Constitutional: Negative for chills and fever.   HENT: Negative for hearing loss.    Eyes: Negative for blurred vision and double vision.   Respiratory: Positive for cough. Negative for shortness of breath.    Cardiovascular: Negative for chest pain and palpitations.   Gastrointestinal: Negative for nausea and vomiting.   Musculoskeletal: Negative for myalgias.   Neurological: Negative for dizziness and headaches.   Psychiatric/Behavioral: Positive for depression. Negative for suicidal ideas.         PAST PSYCHIATRIC HISTORY  Reports previously on wellbutrin     FAMILY HISTORY  Denied       SOCIAL HISTORY  Lives with mom    DRUGS: \"many\" does not elaborate    ALCOHOL: +    TOBACCO: denied     MEDICAL HISTORY       No past medical history on file.  No results found for this or any previous visit.    Lab Results   Component Value Date/Time    WBC 5.6 08/22/2021 11:59 AM    RBC 2.46 (L) 08/22/2021 11:59 AM    HEMOGLOBIN 7.2 (L) 08/22/2021 11:59 AM    HEMATOCRIT 22.4 (L) 08/22/2021 11:59 AM    MCV 91.1 08/22/2021 11:59 AM    MCH 29.3 08/22/2021 11:59 AM    MCHC 32.1 (L) 08/22/2021 11:59 AM    MPV 10.2 08/22/2021 11:59 AM      Lab Results   Component Value Date/Time    SODIUM 128 (L) 08/22/2021 11:59 AM    POTASSIUM 4.3 08/22/2021 11:59 AM    CHLORIDE 96 08/22/2021 11:59 AM    CO2 12 (L) 08/22/2021 11:59 AM    GLUCOSE 234 (H) 08/22/2021 11:59 AM    BUN 28 (H) 08/22/2021 11:59 AM    CREATININE 0.92 08/22/2021 11:59 AM            CURRENT MEDICATIONS         Current Facility-Administered Medications:   •  loperamide  No current outpatient medications on file.     ALLERGIES       Not on File    MENTAL STATUS EXAMINATION    BP (P) 128/61   Pulse (P) 81   Temp 36.9 °C (98.4 °F) (Temporal)   Resp (P) 18   Ht 1.778 m (5' 10\")   Wt 113 kg (250 lb)   SpO2 (P) 97%   BMI 35.87 kg/m²   Participation: Active verbal participation  Grooming:Casual  Orientation: Fully Oriented  Eye contact: Good  Behavior:Calm   Mood: " Euthymic  Affect: Flexible  Thought process: Goal-directed  Thought content:  Preoccupation and Evidence of delusion  Speech: Rate within normal limits  Perception:  Within normal limits  Memory:  No gross evidence of memory deficits  Insight: Limited  Judgment: Limited  Family/couple interaction observations:   Other:    CURRENT RISK       Suicidal:Low       Homicidal:Low       Self-Harm:Low       Relapse:High       Crisis Safety Plan Reviewed No    ASSESSMENT       More conversant today, continues to endorse paranoid ideation about a group of people controlling his life, which has subsequently caused him to experience low mood, anhedonia, helpless/hopelessness. Denies any SI at this time, he is open to trial of zyprexa for psychosis. Will also obtain EKG and extend legal hold.       DSM-5 Dx  Psychosis NOS   Rule out schizophrenia   Rule out substance induced psychosis       PLAN  - Medications    Start Zyprexa 10mg po hs   - Labs reviewed, CBC, UDS   - Imaging reviewed, CT head 8/22/21  - EKG reviewed, Qtc   - ordered     -please monitor Qtc with neuroleptic use    Legal Hold: extended   Sitter: 1:1  Visitors: restricted   Personal Belongings: restricted   Phone: hospital only, immediate family, supervised.       Discussed findings, diagnosis and recommendations with Dr. Mcghee

## 2021-08-25 NOTE — ED NOTES
CMP drawn and sent to lab. EKG also done. Pt requesting a shower and has been placed on the shower list for whenever staff is available.

## 2021-08-25 NOTE — ED NOTES
Pt resting in bed in view of sitter at this time. Pt's breaths are even and unlabored, no acute distress noted. Will continue to monitor pt while awaiting further information regarding disposition.

## 2021-08-25 NOTE — ED NOTES
Pt ambulated to BR w/ steady gait, tech supervised. Pt stating some diarrhea starting now. NAD noted, sitter in direct view of pt.

## 2021-08-25 NOTE — ED NOTES
FSBS 404. Insulin order clarified with ERP, awaiting pharmacy verification. Pt denies SI at this time. 1:1 sitter in direct view of patient.

## 2021-08-25 NOTE — ED NOTES
Pt sitting up in gurney, gets up and ambulates around room. NAD noted, sitter in direct view of pt.

## 2021-08-25 NOTE — ED NOTES
Pt resting in gurney w/ eyes closed, respirations even and unlabored. NAD Noted, sitter in direct view of pt.

## 2021-08-25 NOTE — ED NOTES
Pt just notified staff that he has Type 2 Diabetes that he controls with insulin at home. FSBS checked immediately and will notify ERP of results. Pt states that he uses Humalog and Lantus at home but is unsure of dosages, will have Med Rec Tech verify pt's home meds and correct doses as soon we possible.

## 2021-08-25 NOTE — ED NOTES
Pt sitting up in gurney, respirations even and unlabored. Sitter in direct view of pt. NAD noted.

## 2021-08-25 NOTE — ED NOTES
Pt given dinner tray, denies pain or thoughts of SI/HI, pt able to communicate in complete sentence. Sitting up in gurEdgard, NAD noted. Sitter in direct view of pt,

## 2021-08-25 NOTE — DISCHARGE PLANNING
Alert Team:     Patient rested throughout the night without incident with 1:1 sitter observing patient safety outside of room. IP psychiatry consult f/u completed 8/24/2021; recommendations in consult for Haldol 5mg PO/IM q6h prn agitation and Ativan 2mg PO/IM q6h prn anxiety; also  to continue 1:1 sitter; Pt's legal hold has been continued for one week. Current psychiatric facilities do not have Covid + units at this time.  Alert Team will continue to monitor.

## 2021-08-25 NOTE — ED NOTES
Received report from LUCRETIA Bañuelos. Upon shift change pt is sleeping in bed with even and unlabored breaths, in no apparent distress. Sitter is in view of pt.

## 2021-08-25 NOTE — DISCHARGE PLANNING
"ALERT team  note:  46 year old male admitted 8/22/21, legal hold, SI, self inflicted laceration to the left forearm; UDS + Amphetamines, THC; Medicaid FFS insurance plan;  UDS + Amphetamines, THC, BZD (received versed in route to the ED); PT IS COVID + and no inpt MH facilities can accommodate a Covid + patient at this time; pt currently denies SI, HI, or self-harm ideation; states current substance use includes ETOH occassionally with last use 8/21/21, Methamphetamines occasionally smoking/snorting/IV use with last use 8/21/21, THC occasionally with last use 8/21/21;  States arrest for \"lewdness\" and in half-way x 1 year with release from half-way approx 1 week ago; denies current outpt MH providers or psych meds; states he needs assistance with housing; writer RN reviewed community  resources with  pt, with written information given, including [facility], [facility] , [facility], [facility]; [facility] currently has housing for Covid + clients; pt verbalized understanding; writer RN to send pt referral to [facility] with recommendation to complete [facility] crisis team paperwork to request Covid + housing when pt stabilized for DC from the ED;  [facility] ED social workers have the [facility] crisis team paperwork to request Covid + housing; writer RN spoke to Keith  at [facility], [phone], who states Covid + housing beds currently available; writer RN updated tele psychiatrist, Dr. Rodriguez, who will re-evaluate pt today  "

## 2021-08-26 VITALS
RESPIRATION RATE: 16 BRPM | TEMPERATURE: 100 F | HEIGHT: 70 IN | WEIGHT: 250 LBS | HEART RATE: 80 BPM | OXYGEN SATURATION: 96 % | DIASTOLIC BLOOD PRESSURE: 62 MMHG | SYSTOLIC BLOOD PRESSURE: 96 MMHG | BODY MASS INDEX: 35.79 KG/M2

## 2021-08-26 LAB
ANION GAP SERPL CALC-SCNC: 11 MMOL/L (ref 7–16)
ANISOCYTOSIS BLD QL SMEAR: ABNORMAL
BASOPHILS # BLD AUTO: 0 % (ref 0–1.8)
BASOPHILS # BLD: 0 K/UL (ref 0–0.12)
BUN SERPL-MCNC: 15 MG/DL (ref 8–22)
CALCIUM SERPL-MCNC: 8.1 MG/DL (ref 8.5–10.5)
CHLORIDE SERPL-SCNC: 96 MMOL/L (ref 96–112)
CO2 SERPL-SCNC: 23 MMOL/L (ref 20–33)
CREAT SERPL-MCNC: 0.9 MG/DL (ref 0.5–1.4)
EKG IMPRESSION: NORMAL
EOSINOPHIL # BLD AUTO: 0.06 K/UL (ref 0–0.51)
EOSINOPHIL NFR BLD: 1.8 % (ref 0–6.9)
ERYTHROCYTE [DISTWIDTH] IN BLOOD BY AUTOMATED COUNT: 40.4 FL (ref 35.9–50)
GLUCOSE BLD-MCNC: 235 MG/DL (ref 65–99)
GLUCOSE BLD-MCNC: 270 MG/DL (ref 65–99)
GLUCOSE BLD-MCNC: 301 MG/DL (ref 65–99)
GLUCOSE BLD-MCNC: 384 MG/DL (ref 65–99)
GLUCOSE SERPL-MCNC: 236 MG/DL (ref 65–99)
HCT VFR BLD AUTO: 28.9 % (ref 42–52)
HGB BLD-MCNC: 10.1 G/DL (ref 14–18)
LYMPHOCYTES # BLD AUTO: 1.52 K/UL (ref 1–4.8)
LYMPHOCYTES NFR BLD: 49.1 % (ref 22–41)
MANUAL DIFF BLD: NORMAL
MCH RBC QN AUTO: 29.7 PG (ref 27–33)
MCHC RBC AUTO-ENTMCNC: 34.9 G/DL (ref 33.7–35.3)
MCV RBC AUTO: 85 FL (ref 81.4–97.8)
MICROCYTES BLD QL SMEAR: ABNORMAL
MONOCYTES # BLD AUTO: 0.3 K/UL (ref 0–0.85)
MONOCYTES NFR BLD AUTO: 9.6 % (ref 0–13.4)
MORPHOLOGY BLD-IMP: NORMAL
NEUTROPHILS # BLD AUTO: 1.22 K/UL (ref 1.82–7.42)
NEUTROPHILS NFR BLD: 39.5 % (ref 44–72)
NRBC # BLD AUTO: 0 K/UL
NRBC BLD-RTO: 0 /100 WBC
PLATELET # BLD AUTO: 220 K/UL (ref 164–446)
PLATELET BLD QL SMEAR: NORMAL
PMV BLD AUTO: 9.4 FL (ref 9–12.9)
POLYCHROMASIA BLD QL SMEAR: NORMAL
POTASSIUM SERPL-SCNC: 4.6 MMOL/L (ref 3.6–5.5)
RBC # BLD AUTO: 3.4 M/UL (ref 4.7–6.1)
RBC BLD AUTO: PRESENT
SODIUM SERPL-SCNC: 130 MMOL/L (ref 135–145)
WBC # BLD AUTO: 3.1 K/UL (ref 4.8–10.8)

## 2021-08-26 PROCEDURE — 96372 THER/PROPH/DIAG INJ SC/IM: CPT | Mod: XU

## 2021-08-26 PROCEDURE — 82962 GLUCOSE BLOOD TEST: CPT

## 2021-08-26 PROCEDURE — 99999 EKG: CPT | Performed by: STUDENT IN AN ORGANIZED HEALTH CARE EDUCATION/TRAINING PROGRAM

## 2021-08-26 PROCEDURE — 99283 EMERGENCY DEPT VISIT LOW MDM: CPT | Mod: 95 | Performed by: PSYCHIATRY & NEUROLOGY

## 2021-08-26 PROCEDURE — A9270 NON-COVERED ITEM OR SERVICE: HCPCS | Performed by: STUDENT IN AN ORGANIZED HEALTH CARE EDUCATION/TRAINING PROGRAM

## 2021-08-26 PROCEDURE — A9270 NON-COVERED ITEM OR SERVICE: HCPCS

## 2021-08-26 PROCEDURE — 700102 HCHG RX REV CODE 250 W/ 637 OVERRIDE(OP): Performed by: STUDENT IN AN ORGANIZED HEALTH CARE EDUCATION/TRAINING PROGRAM

## 2021-08-26 PROCEDURE — 85027 COMPLETE CBC AUTOMATED: CPT

## 2021-08-26 PROCEDURE — 700102 HCHG RX REV CODE 250 W/ 637 OVERRIDE(OP)

## 2021-08-26 PROCEDURE — A9270 NON-COVERED ITEM OR SERVICE: HCPCS | Performed by: EMERGENCY MEDICINE

## 2021-08-26 PROCEDURE — 85007 BL SMEAR W/DIFF WBC COUNT: CPT

## 2021-08-26 PROCEDURE — 80048 BASIC METABOLIC PNL TOTAL CA: CPT

## 2021-08-26 PROCEDURE — 700102 HCHG RX REV CODE 250 W/ 637 OVERRIDE(OP): Performed by: EMERGENCY MEDICINE

## 2021-08-26 RX ORDER — DIPHENHYDRAMINE HCL 25 MG
50 TABLET ORAL ONCE
Status: COMPLETED | OUTPATIENT
Start: 2021-08-26 | End: 2021-08-26

## 2021-08-26 RX ORDER — ACETAMINOPHEN 325 MG/1
650 TABLET ORAL ONCE
Status: COMPLETED | OUTPATIENT
Start: 2021-08-26 | End: 2021-08-26

## 2021-08-26 RX ORDER — FLUTICASONE PROPIONATE 50 MCG
2 SPRAY, SUSPENSION (ML) NASAL DAILY
Qty: 16 G | Refills: 0 | Status: SHIPPED | OUTPATIENT
Start: 2021-08-26 | End: 2021-09-09

## 2021-08-26 RX ORDER — OMEPRAZOLE 20 MG/1
20 CAPSULE, DELAYED RELEASE ORAL DAILY
Status: DISCONTINUED | OUTPATIENT
Start: 2021-08-26 | End: 2021-08-26 | Stop reason: HOSPADM

## 2021-08-26 RX ORDER — OMEPRAZOLE 20 MG/1
20 CAPSULE, DELAYED RELEASE ORAL DAILY
Qty: 14 CAPSULE | Refills: 0 | Status: SHIPPED | OUTPATIENT
Start: 2021-08-26 | End: 2021-09-09

## 2021-08-26 RX ORDER — GABAPENTIN 300 MG/1
600 CAPSULE ORAL 3 TIMES DAILY
Status: DISCONTINUED | OUTPATIENT
Start: 2021-08-26 | End: 2021-08-26 | Stop reason: HOSPADM

## 2021-08-26 RX ORDER — GABAPENTIN 300 MG/1
600 CAPSULE ORAL 3 TIMES DAILY
Qty: 84 CAPSULE | Refills: 0 | Status: SHIPPED | OUTPATIENT
Start: 2021-08-26 | End: 2021-09-09

## 2021-08-26 RX ORDER — FLUTICASONE PROPIONATE 50 MCG
2 SPRAY, SUSPENSION (ML) NASAL DAILY
Status: DISCONTINUED | OUTPATIENT
Start: 2021-08-26 | End: 2021-08-26 | Stop reason: HOSPADM

## 2021-08-26 RX ORDER — OLANZAPINE 10 MG/1
10 TABLET ORAL
Qty: 30 TABLET | Refills: 0 | Status: SHIPPED | OUTPATIENT
Start: 2021-08-26

## 2021-08-26 RX ORDER — INSULIN GLARGINE 100 [IU]/ML
10 INJECTION, SOLUTION SUBCUTANEOUS 2 TIMES DAILY
Qty: 3 ML | Refills: 0 | Status: SHIPPED | OUTPATIENT
Start: 2021-08-26 | End: 2021-09-09

## 2021-08-26 RX ADMIN — GABAPENTIN 600 MG: 300 CAPSULE ORAL at 19:21

## 2021-08-26 RX ADMIN — DIPHENHYDRAMINE HYDROCHLORIDE 50 MG: 25 TABLET ORAL at 01:46

## 2021-08-26 RX ADMIN — ACETAMINOPHEN 650 MG: 325 TABLET, FILM COATED ORAL at 08:46

## 2021-08-26 RX ADMIN — ASPIRIN 81 MG: 81 TABLET, COATED ORAL at 08:08

## 2021-08-26 RX ADMIN — FLUTICASONE PROPIONATE 100 MCG: 50 SPRAY, METERED NASAL at 08:47

## 2021-08-26 RX ADMIN — GABAPENTIN 600 MG: 300 CAPSULE ORAL at 08:08

## 2021-08-26 RX ADMIN — INSULIN GLARGINE 10 UNITS: 100 INJECTION, SOLUTION SUBCUTANEOUS at 19:20

## 2021-08-26 RX ADMIN — OMEPRAZOLE 20 MG: 20 CAPSULE, DELAYED RELEASE ORAL at 08:09

## 2021-08-26 RX ADMIN — INSULIN GLARGINE 10 UNITS: 100 INJECTION, SOLUTION SUBCUTANEOUS at 09:41

## 2021-08-26 RX ADMIN — GABAPENTIN 600 MG: 300 CAPSULE ORAL at 13:55

## 2021-08-26 RX ADMIN — OLANZAPINE 10 MG: 5 TABLET, FILM COATED ORAL at 19:21

## 2021-08-26 NOTE — DISCHARGE PLANNING
Received Stipulation and Order from the court continuing pt's legal hold until 9/2. Sent copy to SW, scanned copy into pt's chart.

## 2021-08-26 NOTE — DISCHARGE PLANNING
Dr. Alana SCHMIDT d/c legal hold as of today at 1415.  Removed pt from legal hold.    Notice of Withdrawal filed with the court via [employer], scanned copy of verified notice onto .

## 2021-08-26 NOTE — ED NOTES
Pt still unable to sleep despite ambien. ERP contacted for orders. 1:1 sitter in direct view of patient.

## 2021-08-26 NOTE — ED NOTES
Pt requested warm drink, hot chocolate provided. FSBS 384, coverage given. 1:1 sitter remains in direct view of patient. 2 of 3 PIVs d/c'd and LUE bandage removed. Pt to shower with ED tech.

## 2021-08-26 NOTE — DISCHARGE PLANNING
MSW received notification that pt is being d/c off Legal hold. Pt will need [facility]. Pt signed consents. MSW sent pt's referral with medical records to [facility] program. MSW will await to hear back.

## 2021-08-26 NOTE — ED NOTES
Pt on telehealth apt with psychiatry. PSA outside of room in direct view of pt. Awake, sitting up eating

## 2021-08-26 NOTE — ED NOTES
Medicated per MAR for sleep. Complete linen change performed and room tidied. Pt provided with personal care kit, oral care performed. Large bandaid placed to cover LUE sutures. Pt resting comfortably with 1:1 sitter in direct view of patient.

## 2021-08-26 NOTE — ED NOTES
Given acetaminophen per MAR order for temp of 100.0f. Pt Awaiting breakfast. Blood glucose checked. Pt went back to sleep. PSA remains outside of room with direct view of pt

## 2021-08-26 NOTE — ED NOTES
Breaking primary RN: Gave patient medication per MAR. Patient resting in bed, unlabored respirations. Safety sitter in direct view of patient. Will continue to monitor.

## 2021-08-26 NOTE — ED NOTES
Pt unable to sleep but resting calmly. Requested home meds to be ordered by ERP. 1:1 sitter in direct view of patient.

## 2021-08-26 NOTE — CONSULTS
This evaluation was conducted via Zoom using secure and encrypted videoconferencing technology. The patient was physically located at [facility] in [place], [place]. The patient was presented by a medical professional at the originating site.   The patient's identity was confirmed and verbal consent was obtained for this telemedicine encounter.                      [facility] INITIAL PSYCHIATRIC EVALUATION    This provider informed the patient their medical records are totally confidential except for the use by other providers involved in their care, or if the patient signs a release, or to report instances of child or elder abuse, or if it is determined they are an immediate risk to harm themselves or others.      CONSULTED BY:   Karen Ryan M.d.     REASON FOR CONSULT:   Psychiatric evaluation    Chief Complaint   Patient presents with   • Trauma Red         HISTORY OF PRESENT ILLNESS  Chart reviewed. Patient consents to telepsychiatry. Patient seen and evaluated via telemedicine for follow up visit. The patient reports remarkable improvement with mood and suicidal thinking since admission to the ER. He report today that since he was able to shower, eat, get back on medications for diabetes and mood stabilization and psychosis his suicidal thinking has subsided. He reports no AVH today. He denies paranoia. He reports passive suicidal ideation with no plan or intent at this time. He is future oriented looking forward to obtaining housing and continuing medical and mental health treatments. He does report that recent relapse on illicit substances and alcohol contributed to his recent act of self harm. Collateral unavailable at this time.         MEDICAL REVIEW OF SYSTEMS:   ROS  DM  Covid 19    PAST PSYCHIATRIC HISTORY  Psychosis    DRUGS: UDS positive for cannabis and amphetamine. Positive ETOH.     MEDICAL HISTORY         Past Medical History:   Diagnosis Date   • Depression    • Diabetes (HCC)     Type II;  controlled with insulin (Humalog and Lantus, pt unsure of dosages)     Results for orders placed or performed during the hospital encounter of 21   EKG   Result Value Ref Range    Report       [facility] Emergency Dept.    Test Date:  2021  Pt Name:    VIDYA DAIGLE               Department: ER  MRN:        [id number]                      Room:       [facility]  Gender:     Male                         Technician: [id number]  :        1975                   Requested By:ADELSO WATKINS  Order #:    [id number]                    Reading MD:    Measurements  Intervals                                Axis  Rate:       62                           P:          21  IA:         141                          QRS:        11  QRSD:       99                           T:          30  QT:         456  QTc:        463    Interpretive Statements  Sinus rhythm  Baseline wander in lead(s) V1  No previous ECG available for comparison         Lab Results   Component Value Date/Time    WBC 3.1 (L) 2021 08:17 AM    RBC 3.40 (L) 2021 08:17 AM    HEMOGLOBIN 10.1 (L) 2021 08:17 AM    HEMATOCRIT 28.9 (L) 2021 08:17 AM    MCV 85.0 2021 08:17 AM    MCH 29.7 2021 08:17 AM    MCHC 34.9 2021 08:17 AM    MPV 9.4 2021 08:17 AM    NEUTSPOLYS 39.50 (L) 2021 08:17 AM    LYMPHOCYTES 49.10 (H) 2021 08:17 AM    MONOCYTES 9.60 2021 08:17 AM    EOSINOPHILS 1.80 2021 08:17 AM    BASOPHILS 0.00 2021 08:17 AM    ANISOCYTOSIS 1+ 2021 08:17 AM      Lab Results   Component Value Date/Time    SODIUM 130 (L) 2021 08:17 AM    POTASSIUM 4.6 2021 08:17 AM    CHLORIDE 96 2021 08:17 AM    CO2 23 2021 08:17 AM    GLUCOSE 236 (H) 2021 08:17 AM    BUN 15 2021 08:17 AM    CREATININE 0.90 2021 08:17 AM            CURRENT MEDICATIONS         Current Facility-Administered Medications:   •  fluticasone  •  omeprazole  •   "gabapentin  •  aspirin EC  •  OLANZapine  •  insulin glargine  •  insulin regular **AND** POC blood glucose manual result **AND** NOTIFY MD and PharmD **AND** glucose **AND** dextrose 50%  •  loperamide    Current Outpatient Medications:   •  insulin lispro, 2-10 Units, Subcutaneous, 4X/DAY ACHS, LAST WEEK at [facility]  •  insulin glargine, 10 Units, Subcutaneous, BID, UNK at [facility]  •  gabapentin, 600 mg, Oral, TID, LAST WEEK at [facility]  •  buPROPion HCl (WELLBUTRIN PO), 2 Tablet, Oral, QAM, LAST WEEK at [facility]  •  aspirin EC, 81 mg, Oral, QAM, LAST WEEK at [facility]  •  VITAMIN D PO, 1 Tablet, Oral, QAM, LAST WEEK at [facility]  •  Multiple Vitamins-Minerals (MULTIVITAL PO), 1 Tablet, Oral, QAM, LAST WEEK at [facility]  •  fluticasone, 2 Spray, Nasal, DAILY, LAST WEEK at [facility]  •  omeprazole, 20 mg, Oral, DAILY     ALLERGIES       No Known Allergies    MENTAL STATUS EXAMINATION    /63   Pulse 87   Temp 37.8 °C (100 °F) (Temporal)   Resp 16   Ht 1.778 m (5' 10\")   Wt 113 kg (250 lb)   SpO2 95%   BMI 35.87 kg/m²   Participation: cooperative  Grooming:appropriately dressed in hospital attire  Orientation: AAO X 4  Eye contact: good  Behavior: WNL  Mood: neutral  Affect: blunted  Thought process: linear, goal directed  Thought content:  Denies HI, AVH. Some passive SI  Speech: WNL  Memory:  good  Insight: fair  Judgment: fair      CURRENT RISK       Suicidal:passive SI, with no intent or plan       Homicidal:Denies       Self-Harm:No current thoughts of self harm       Relapse:high risk of relapse on substances      ASSESSMENT       46 y.o. male with history of psychosis, stimulant use, ETOH use s/p recent self harm by cutting himself presents today with remarkable improvement of mood and feeling of well being since being able to take care of hygiene and restart medications while in the ER. He is hopeful and future oriented wanting to obtain housing and continue with outpatient treatment.       DSM-5 Dx  Psychotic disorder " unspecified    PLAN  -Recommend D/C Legal 2000 as patient no longer has active thoughts of self harm and does not meet other legal hold criteria  -Continue Zyprexa 10mg PO QHS for mood/psychosis  [facility]     - Labs reviewed    Legal Hold: Recommend D/C     Discussed findings, diagnosis and recommendations with Dr. Karen Ryan

## 2021-08-26 NOTE — DISCHARGE PLANNING
ALERT team BH note:  46 year old male admitted 8/22/21, legal hold, SI, self inflicted laceration to the left forearm; UDS + Amphetamines, THC; Medicaid FFS insurance plan;  UDS + Amphetamines, THC, BZD (received versed in route to the ED); PT IS COVID +; pt evaluated by [facility] tele psychiatrist, Dr. Hair, with recommendation to DC legal hold; legal hold DC'd by [facility] ERP Dr. Ryan; writer RN again reviewed community MH, CD, and homeless resources with  pt, with written information given yesterday; pt states he is currently homeless, no outpt MH or medical resources, and no support in [place]; writer RN updated [facility] ED SW re: pt DC and requested assistance with [facility] Covid + housing application; pt signed release for this and SW to fax to [facility]; writer RN to fax pt referral to [facility] outpt services    8/26/21 at 1850:  Per RAINA, acceptance to [facility] Covid + housing still pending; SW states referral has been sent to [facility] and [facility] and time is unknown when [facility] will hear from the [facility] if pt has been accepted; writer RN updated [facility] ED RN, Gary and ED charge nurse, Ashley, that pt may be here overnight to facilitate safe and appropriate DC planning to decrease chance of rapid re-admission; per Gary, pt may cont to receive ordered medications and tx until pt is DC'd

## 2021-08-26 NOTE — ED PROVIDER NOTES
ED PROVIDER NOTE    Scribed for Karen Ryan M.D. by Sangeetha Ross. 8/26/2021, 6:9 AM.    This is an addendum to the note on Vidya Wilson. For further details and full chart entry, see the previously signed ED Provider Note written by Dr. Carballo ([facility]).      6:09 AM - I discussed the patient's case with Dr. Hare (ERP) who will transfer care of the patient to me at this time.        6:32 AM - Reviewed the patients records and his full chart entry which showed the patient came in as a tauma red with self inflicted laceration to his arm. He has a psychiatric history as well as a past medical history of diabetes, anemia, and currently has a COVID infection. There has been difficulty with placement secondary to the patients COVID infection and he does not meet criteria for admission. He has a repeat BMP and CBC w/ diff ordered for today and we will continue to monitor his labs.     11:12 AM - Reviewed the patient's lab and imaging results which show improvements form prior labs.    Labs:   Results for VIDYA WILSON (MRN [id number]) as of 8/26/2021 11:11   Ref. Range 8/26/2021 08:17   WBC Latest Ref Range: 4.8 - 10.8 K/uL 3.1 (L)   RBC Latest Ref Range: 4.70 - 6.10 M/uL 3.40 (L)   Hemoglobin Latest Ref Range: 14.0 - 18.0 g/dL 10.1 (L)   Hematocrit Latest Ref Range: 42.0 - 52.0 % 28.9 (L)   MCV Latest Ref Range: 81.4 - 97.8 fL 85.0   MCH Latest Ref Range: 27.0 - 33.0 pg 29.7   MCHC Latest Ref Range: 33.7 - 35.3 g/dL 34.9   RDW Latest Ref Range: 35.9 - 50.0 fL 40.4   Platelet Count Latest Ref Range: 164 - 446 K/uL 220   MPV Latest Ref Range: 9.0 - 12.9 fL 9.4   Neutrophils-Polys Latest Ref Range: 44.00 - 72.00 % 39.50 (L)   Neutrophils (Absolute) Latest Ref Range: 1.82 - 7.42 K/uL 1.22 (L)   Lymphocytes Latest Ref Range: 22.00 - 41.00 % 49.10 (H)   Lymphs (Absolute) Latest Ref Range: 1.00 - 4.80 K/uL 1.52   Monocytes Latest Ref Range: 0.00 - 13.40 % 9.60   Monos (Absolute) Latest Ref Range: 0.00 - 0.85 K/uL 0.30    Eosinophils Latest Ref Range: 0.00 - 6.90 % 1.80   Eos (Absolute) Latest Ref Range: 0.00 - 0.51 K/uL 0.06   Basophils Latest Ref Range: 0.00 - 1.80 % 0.00   Baso (Absolute) Latest Ref Range: 0.00 - 0.12 K/uL 0.00   Nucleated RBC Latest Units: /100 WBC 0.00   NRBC (Absolute) Latest Units: K/uL 0.00   Plt Estimation Unknown Normal   RBC Morphology Unknown Present   Anisocytosis Unknown 1+   Microcytosis Unknown 1+   Polychromia Unknown 1+   Peripheral Smear Review Unknown see below   Manual Diff Status Unknown PERFORMED   Sodium Latest Ref Range: 135 - 145 mmol/L 130 (L)   Potassium Latest Ref Range: 3.6 - 5.5 mmol/L 4.6   Chloride Latest Ref Range: 96 - 112 mmol/L 96   Co2 Latest Ref Range: 20 - 33 mmol/L 23   Anion Gap Latest Ref Range: 7.0 - 16.0  11.0   Glucose Latest Ref Range: 65 - 99 mg/dL 236 (H)   Bun Latest Ref Range: 8 - 22 mg/dL 15   Creatinine Latest Ref Range: 0.50 - 1.40 mg/dL 0.90   GFR If  Latest Ref Range: >60 mL/min/1.73 m 2 >60   GFR If Non  Latest Ref Range: >60 mL/min/1.73 m 2 >60   Calcium Latest Ref Range: 8.5 - 10.5 mg/dL 8.1 (L)     All labs reviewed by me.      2:19 PM I spoke with Dr. Warner who believes the [facility] can [facility] and take his Zyprexa nightly his legal hold can be lifted at this time and he will be [facility].      FINAL IMPRESSION   1. Self-inflicted injury    2. Arm laceration, left, initial encounter    3. Blood loss anemia    4. Alcoholic intoxication with complication (HCC)    5. Suicidal ideation    6. Hyperglycemia    7. COVID-19         Sangeetha HAIR (Scribaudi), am scribing for, and in the presence of, Karen Ryan M.D..    Electronically signed by: Sangeetha Ross (Edward), 8/26/2021    Karen HAIR M.D. personally performed the services described in this documentation, as scribed by Sangeetha Ross in my presence, and it is both accurate and complete.    The note accurately reflects  work and decisions made by me.  Karen Ryan M.D.  8/26/2021  11:13 AM

## 2021-08-26 NOTE — ED NOTES
Assumed care of patient from LUCRETIA Soler. Pt appears to be sleeping, respirations even and unlabored. Lights off.  at bedside, pt in direct view

## 2021-08-26 NOTE — ED NOTES
Patient sleeping. Chest rise and body position changes observed. 1:1 sitter at door for continuous observation.

## 2021-08-27 NOTE — DISCHARGE PLANNING
MSW spoke to Mitra at [facility]. Pt has been accepted in their housing. Mitra stated she will call [facility] for transport and let MSW know what time. [facility] of [facility].

## 2022-10-25 ENCOUNTER — HOSPITAL ENCOUNTER (EMERGENCY)
Facility: MEDICAL CENTER | Age: 47
End: 2022-10-25
Attending: EMERGENCY MEDICINE
Payer: MEDICAID

## 2022-10-25 VITALS
OXYGEN SATURATION: 96 % | DIASTOLIC BLOOD PRESSURE: 98 MMHG | TEMPERATURE: 97 F | HEART RATE: 113 BPM | SYSTOLIC BLOOD PRESSURE: 148 MMHG | RESPIRATION RATE: 18 BRPM | HEIGHT: 70 IN | BODY MASS INDEX: 35.79 KG/M2 | WEIGHT: 250 LBS

## 2022-10-25 DIAGNOSIS — R45.1 AGITATION: ICD-10-CM

## 2022-10-25 LAB
EKG IMPRESSION: NORMAL
POC BREATHALIZER: 0 PERCENT (ref 0–0.01)

## 2022-10-25 PROCEDURE — 302970 POC BREATHALIZER

## 2022-10-25 PROCEDURE — 99284 EMERGENCY DEPT VISIT MOD MDM: CPT

## 2022-10-25 PROCEDURE — 93005 ELECTROCARDIOGRAM TRACING: CPT | Performed by: EMERGENCY MEDICINE

## 2022-10-25 PROCEDURE — 302970 POC BREATHALIZER: Performed by: EMERGENCY MEDICINE

## 2022-10-25 ASSESSMENT — LIFESTYLE VARIABLES: DOES PATIENT WANT TO STOP DRINKING: NO

## 2022-10-25 ASSESSMENT — FIBROSIS 4 INDEX
FIB4 SCORE: 1.48
FIB4 SCORE: 1.48

## 2022-10-26 NOTE — ED PROVIDER NOTES
"ED Provider Note    CHIEF COMPLAINT  Chief Complaint   Patient presents with    Agitation     Pt biba with pd from [facility] [employer] for eval of agitation and meth use. Pt was tazed by pd pta. 2 prongs in pts arm that ems removed without difficutly. Pt was combative on scene received 3 mg versed IN and 2 mg versed IV.        HPI  Manny Wilson is a 47 y.o. male here for evaluation of agitation.  Patient was brought in by EMS from [employer], after it was noted that he was yelling and being combative.  He admits to meth amphetamine use, and when EMS arrived with [place] police, he was tased.  Patient did not fall to the ground, and did not strike his head.  He has no other reported injuries.  He was given Versed in route, which finally made him settle down.  He has no reported chest pain or shortness of breath.  No vomiting.  Patient had prongs removed by EMS, and this was without difficulty.    ROS;  Please see HPI  O/W negative     PAST MEDICAL HISTORY   has a past medical history of Depression, Diabetes (), Diabetes (), and Infectious disease ().    SOCIAL HISTORY  Social History     Tobacco Use    Smoking status: Former     Types: Cigarettes     Quit date: 2014     Years since quittin.1    Smokeless tobacco: Never    Tobacco comments:     4-5 cigarettes a week   Vaping Use    Vaping Use: Never used   Substance and Sexual Activity    Alcohol use: Yes     Comment: \"1-2x/month\"    Drug use: Yes     Comment: marijuana and meth (smokes and injects)    Sexual activity: Not on file       SURGICAL HISTORY   has a past surgical history that includes other orthopedic surgery (); thoracoscopy (1/3/2011); decortication (1/3/2011); gastrostomy (3/20/2015); and exploratory laparotomy (3/20/2015).    CURRENT MEDICATIONS  Home Medications    **Home medications have not yet been reviewed for this encounter**         ALLERGIES  No Known Allergies    REVIEW OF SYSTEMS  See HPI for further details. Review of " "systems as above, otherwise all other systems are negative.     PHYSICAL EXAM  VITAL SIGNS: BP (!) 148/96   Pulse (!) 127   Temp 37.1 °C (98.7 °F) (Temporal)   Resp 16   Ht 1.778 m (5' 10\")   Wt 113 kg (250 lb)   SpO2 94%   BMI 35.87 kg/m²     Constitutional: Well developed, well nourished. mild acute distress.  HEENT: Normocephalic, atraumatic. MMM  Neck: Supple, Full range of motion   Chest/Pulmonary:  No respiratory distress.  Equal expansion   Musculoskeletal: No deformity, no edema, neurovascular intact.   Neuro: slow speech, inappropriate, uncooperative, cranial nerves II-XII grossly intact.  Psych: sedated mood and affect      PROCEDURES     MEDICAL RECORD  I have reviewed patient's medical record and pertinent results are listed.    COURSE & MEDICAL DECISION MAKING  I have reviewed any medical record information, laboratory studies and radiographic results as noted above.    Results for orders placed or performed during the hospital encounter of 10/25/22   POC BREATHALIZER   Result Value Ref Range    POC Breathalizer 0.000 0.00 - 0.01 Percent   EKG   Result Value Ref Range    Report       [facility] Emergency Dept.    Test Date:  2022-10-25  Pt Name:    VIDYA DAIGLE               Department: ER  MRN:        [id number]                      Room:       [facility]  Gender:     Male                         Technician: [id number]  :        1975                   Requested By:COLTON PAULINO  Order #:    [id number]                    Reading MD:    Measurements  Intervals                                Axis  Rate:       122                          P:          26  SC:         139                          QRS:        -82  QRSD:       94                           T:          68  QT:         331  QTc:        472    Interpretive Statements  Sinus tachycardia  Left anterior fascicular block  Abnormal R-wave progression, late transition  Compared to ECG 2021 12:50:36  Left anterior " fascicular block now present  Sinus rhythm no longer present       Ekg;  sinus tach at 122.  No st elevation, no st depression, qtc 472.  Compared to ekg from 8/25/21.      6:55 PM  Patient has been up, walking around, eating and drinking without any difficulty.  He does have a mild tachycardia, which is likely from his methamphetamine use, as he has no chest pain, or shortness of breath.  Patient is medically cleared to go with [facility].    I you have had any blood pressure issues while here in the emergency department, please see your doctor for a further evaluation or work up.    Differential diagnoses include but not limited to: agitation, arrhythmia     This patient presents with agitation  .  At this time, I have counseled the patient/family regarding their medications, pain control, and follow up.  They will continue their medications, if any, as prescribed.  They will return immediately for any worsening symptoms and/or any other medical concerns.  They will see their doctor, or contact the doctor provided, in 1-2 days for follow up.       FINAL IMPRESSION  1. Agitation                Electronically signed by: Glynn Hines D.O., 10/25/2022 5:21 PM

## 2022-10-26 NOTE — ED NOTES
Pt ambulated with no difficulty. Alert and oreinted, resp even and unlabored, skin pwd. pts heart rate 115- md aware. Pt dced to detention with pd